# Patient Record
Sex: FEMALE | Race: BLACK OR AFRICAN AMERICAN | NOT HISPANIC OR LATINO | URBAN - METROPOLITAN AREA
[De-identification: names, ages, dates, MRNs, and addresses within clinical notes are randomized per-mention and may not be internally consistent; named-entity substitution may affect disease eponyms.]

---

## 2018-11-30 ENCOUNTER — EMERGENCY (EMERGENCY)
Facility: HOSPITAL | Age: 58
LOS: 1 days | Discharge: HOME | End: 2018-11-30
Admitting: EMERGENCY MEDICINE
Payer: MEDICARE

## 2018-11-30 VITALS
RESPIRATION RATE: 18 BRPM | TEMPERATURE: 98 F | SYSTOLIC BLOOD PRESSURE: 148 MMHG | HEART RATE: 86 BPM | OXYGEN SATURATION: 100 %

## 2018-11-30 VITALS
OXYGEN SATURATION: 96 % | TEMPERATURE: 98 F | SYSTOLIC BLOOD PRESSURE: 174 MMHG | DIASTOLIC BLOOD PRESSURE: 91 MMHG | RESPIRATION RATE: 18 BRPM | HEART RATE: 109 BPM

## 2018-11-30 DIAGNOSIS — Z90.49 ACQUIRED ABSENCE OF OTHER SPECIFIED PARTS OF DIGESTIVE TRACT: Chronic | ICD-10-CM

## 2018-11-30 DIAGNOSIS — Z98.890 OTHER SPECIFIED POSTPROCEDURAL STATES: Chronic | ICD-10-CM

## 2018-11-30 LAB — TROPONIN I SERPL-MCNC: <0.017 NG/ML — LOW (ref 0.02–0.06)

## 2018-11-30 PROCEDURE — 99284 EMERGENCY DEPT VISIT MOD MDM: CPT | Mod: 25

## 2018-11-30 PROCEDURE — 93010 ELECTROCARDIOGRAM REPORT: CPT

## 2018-11-30 RX ORDER — SODIUM CHLORIDE 9 MG/ML
500 INJECTION INTRAMUSCULAR; INTRAVENOUS; SUBCUTANEOUS ONCE
Qty: 0 | Refills: 0 | Status: COMPLETED | OUTPATIENT
Start: 2018-11-30 | End: 2018-11-30

## 2018-11-30 RX ORDER — LEVOCETIRIZINE DIHYDROCHLORIDE 0.5 MG/ML
1 SOLUTION ORAL
Qty: 10 | Refills: 0 | OUTPATIENT
Start: 2018-11-30 | End: 2018-12-09

## 2018-11-30 RX ORDER — EPINEPHRINE 0.3 MG/.3ML
0.3 INJECTION INTRAMUSCULAR; SUBCUTANEOUS
Qty: 1 | Refills: 0 | OUTPATIENT
Start: 2018-11-30

## 2018-11-30 RX ADMIN — SODIUM CHLORIDE 1000 MILLILITER(S): 9 INJECTION INTRAMUSCULAR; INTRAVENOUS; SUBCUTANEOUS at 13:30

## 2018-11-30 NOTE — ED PROVIDER NOTE - MEDICAL DECISION MAKING DETAILS
AFVSS at time of d/c, pt non-toxic appearing, results, ddx, and f/u plans discussed with pt at bedside, d/c'd home to f/u with PMD, strict return precautions discussed, prompt return to ER for any worsening or new sx, pt verbalized understanding. pt currently receiving remicade infusion tx for RA, noted tingling sensation with throat closing/chest burning/generalized itching during infusion, given IV antihistamine/steroids and IM epi PTA to the ED, well appearing, no respiratory distress, AFVSS at time of d/c, pt non-toxic appearing, results, ddx, and f/u plans discussed with pt at bedside, d/c'd home to f/u with PMD, strict return precautions discussed, prompt return to ER for any worsening or new sx, pt verbalized understanding.

## 2018-11-30 NOTE — ED PROVIDER NOTE - PMH
Anxiety    Crohn's colitis    HLD (hyperlipidemia)    HTN (hypertension)    Liver cirrhosis    Primary sclerosing cholangitis    Rheumatoid arthritis

## 2018-11-30 NOTE — ED PROVIDER NOTE - OBJECTIVE STATEMENT
57 yo F with PMHx of liver cirrhosis, on liver transplant list, being followed by [facility], HTN, HLD, primary sclerosing cholangitis, UC s/p colectomy with ostomy bag in 2014, RA, anxiety, chronic R ankle arthritis s/p surgery and CAM boot, BIBA for lip tingling and throat closing sensation in the midst of getting her remicade infusion this morning at [facility] outpt clinic.  Pt reports sudden onset of chest tightness with tingling sensation in her lips and throat and throat closing sensation while getting her remicade infusion (4th time getting current med).  Noted burning sensation in her chest with tightness and subjective sob.  Was given epi IM, benadryl 50mg IV, pepcid 20mg IVP, and solumedrol 125mg IVP with improvement in sx.  EMS called and pt transported here for further evaluation.  Reports improvement in sx after meds and supplemental O2.  Denies fever, chills, CP, palpitations, wheezing, stridors, tongue/lip swelling, focal weakness, HA, dizziness, N/V/D/C, oral/genital lesions, cough, paresthesia, numbness, tingling, malaise, and diaphoresis.  No new products/meds/food reported. Last stress test and TTE one wk ago with normal findings, EF 60-65%. 59 yo F with PMHx of liver cirrhosis, on liver transplant list, being followed by [facility], HTN, HLD, primary sclerosing cholangitis, UC s/p colectomy with ostomy bag in 2014, RA, anxiety, chronic R ankle arthritis s/p surgery and CAM boot, BIBA for lip tingling and throat closing sensation in the midst of getting her remicade infusion this morning at [facility] outpt clinic.  Pt reports sudden onset of chest tightness with tingling sensation in her lips and throat and throat closing sensation while getting her remicade infusion (3rd time getting current med).  Noted burning sensation in her chest with tightness and generalized itching sensation.  Was given epi IM, benadryl 50mg IV, pepcid 20mg IVP, and solumedrol 125mg IVP with improvement in sx.  EMS called and pt transported here for further evaluation.  Reports improvement in sx after meds and supplemental O2.  Denies hives, rash, fever, chills, CP, palpitations, wheezing, stridors, tongue/lip swelling, focal weakness, HA, dizziness, N/V/D/C, oral/genital lesions, cough, paresthesia, numbness, tingling, malaise, and diaphoresis.  No new products/meds/food reported. Last stress test and TTE one wk ago with normal findings, EF 60-65%.

## 2018-11-30 NOTE — ED PROVIDER NOTE - PHYSICAL EXAMINATION
Vital Signs - nursing notes reviewed and confirmed  Gen - WDWN F, NAD, comfortable and non-toxic appearing, speaking in full sentences   Skin - warm, dry, intact, no acute rash noted   HEENT - AT/NC, PERRL, EOMI, no conjunctival injection, moist oral mucosa, TM intact b/l with good cone of lights, o/p clear with no erythema, edema, or exudate, uvula midline, airway patent, neck supple and NT, FROM, no JVD or carotid bruits b/l, no palpable nodes  CV - S1S2, R/R/R  Resp - respiration non-labored, CTAB, symmetric bs b/l, no r/r/w  GI - NABS, soft, protuberant, NT, no CVAT b/l   MS - w/w/p, RLE in CAM boot, calves supple and NT, distal pulses symmetric b/l, brisk cap refills, +SILT  Neuro - AxOx3, no focal neuro deficits, CN II-XII grossly intact, cerebellar function intact, negative pronator drift, negative nystagmus, ambulatory with assistance

## 2018-11-30 NOTE — ED ADULT NURSE NOTE - OBJECTIVE STATEMENT
cy from [place] outpatient clinic with c/o chest tightness-now resolved, and throat tightness after receiving Remicade infusion.all symptoms resolved except throat tightness pt states its improved s/p meds (epi/benadryl/steroids pta)., placed on cont pulse ox and cardiac monitor

## 2018-11-30 NOTE — ED ADULT TRIAGE NOTE - CHIEF COMPLAINT QUOTE
patient was getting infusion for rheumatoid arthritis when she started feeling throat itchiness and chest tightness. patient still c/o of throat itchiness. patient able to speak compelte full sentences.

## 2018-11-30 NOTE — ED PROVIDER NOTE - CARE PROVIDER_API CALL
Adela Hargrove), Dermatology  [address] [address]  Phone: [phone]  Fax: [phone]    your pmd,   Phone: (   )    -  Fax: (   )    -

## 2018-11-30 NOTE — ED ADULT NURSE NOTE - NSIMPLEMENTINTERV_GEN_ALL_ED
Implemented All Universal Safety Interventions:  [place] to call system. Call bell, personal items and telephone within reach. Instruct patient to call for assistance. Room bathroom lighting operational. Non-slip footwear when patient is off stretcher. Physically safe environment: no spills, clutter or unnecessary equipment. Stretcher in lowest position, wheels locked, appropriate side rails in place.

## 2018-12-04 DIAGNOSIS — I10 ESSENTIAL (PRIMARY) HYPERTENSION: ICD-10-CM

## 2018-12-04 DIAGNOSIS — T78.40XA ALLERGY, UNSPECIFIED, INITIAL ENCOUNTER: ICD-10-CM

## 2018-12-04 DIAGNOSIS — R07.89 OTHER CHEST PAIN: ICD-10-CM

## 2018-12-04 DIAGNOSIS — L29.9 PRURITUS, UNSPECIFIED: ICD-10-CM

## 2018-12-04 DIAGNOSIS — T39.4X5A ADVERSE EFFECT OF ANTIRHEUMATICS, NOT ELSEWHERE CLASSIFIED, INITIAL ENCOUNTER: ICD-10-CM

## 2018-12-04 DIAGNOSIS — Z87.891 PERSONAL HISTORY OF NICOTINE DEPENDENCE: ICD-10-CM

## 2018-12-04 DIAGNOSIS — Z90.49 ACQUIRED ABSENCE OF OTHER SPECIFIED PARTS OF DIGESTIVE TRACT: ICD-10-CM

## 2018-12-04 DIAGNOSIS — E78.5 HYPERLIPIDEMIA, UNSPECIFIED: ICD-10-CM

## 2018-12-04 DIAGNOSIS — R06.02 SHORTNESS OF BREATH: ICD-10-CM
